# Patient Record
Sex: FEMALE | Race: WHITE | NOT HISPANIC OR LATINO
[De-identification: names, ages, dates, MRNs, and addresses within clinical notes are randomized per-mention and may not be internally consistent; named-entity substitution may affect disease eponyms.]

---

## 2022-12-11 ENCOUNTER — NON-APPOINTMENT (OUTPATIENT)
Age: 67
End: 2022-12-11

## 2023-07-24 ENCOUNTER — APPOINTMENT (OUTPATIENT)
Dept: ORTHOPEDIC SURGERY | Facility: CLINIC | Age: 68
End: 2023-07-24
Payer: MEDICARE

## 2023-07-24 VITALS — BODY MASS INDEX: 39.24 KG/M2 | HEIGHT: 67 IN | WEIGHT: 250 LBS

## 2023-07-24 VITALS — BODY MASS INDEX: 41.12 KG/M2 | WEIGHT: 262 LBS | HEIGHT: 67 IN

## 2023-07-24 DIAGNOSIS — Z78.9 OTHER SPECIFIED HEALTH STATUS: ICD-10-CM

## 2023-07-24 DIAGNOSIS — I10 ESSENTIAL (PRIMARY) HYPERTENSION: ICD-10-CM

## 2023-07-24 DIAGNOSIS — M19.90 UNSPECIFIED OSTEOARTHRITIS, UNSPECIFIED SITE: ICD-10-CM

## 2023-07-24 DIAGNOSIS — E11.9 TYPE 2 DIABETES MELLITUS W/OUT COMPLICATIONS: ICD-10-CM

## 2023-07-24 PROBLEM — Z00.00 ENCOUNTER FOR PREVENTIVE HEALTH EXAMINATION: Status: ACTIVE | Noted: 2023-07-24

## 2023-07-24 PROCEDURE — 20610 DRAIN/INJ JOINT/BURSA W/O US: CPT | Mod: RT

## 2023-07-24 PROCEDURE — 99204 OFFICE O/P NEW MOD 45 MIN: CPT | Mod: 25

## 2023-07-24 RX ORDER — TRAMADOL HYDROCHLORIDE 25 MG/1
TABLET, COATED ORAL
Refills: 0 | Status: ACTIVE | COMMUNITY

## 2023-07-24 RX ORDER — METFORMIN HYDROCHLORIDE 625 MG/1
TABLET ORAL
Refills: 0 | Status: ACTIVE | COMMUNITY

## 2023-07-24 RX ORDER — METOPROLOL TARTRATE 75 MG/1
TABLET, FILM COATED ORAL
Refills: 0 | Status: ACTIVE | COMMUNITY

## 2023-07-24 NOTE — DISCUSSION/SUMMARY
[de-identified] : Her Right knee is more painful then the Left\par Discussed options. If she does require TKA at some point she will need to get BMI under 40\par \par Large joint injection was performed of the RIGHT knee.\par The indication for this procedure was pain, inflammation and x-ray evidence of Osteoarthritis on this or prior visit. The site was prepped with betadine and sterile technique used.An injection of Lidocaine 5cc of 1%  was used Betamethasone (Celestone) 1cc of 6mg. \par Patient tolerated procedure well. Patient was advised to call if redness, pain or fever occur and apply ice for 15 minutes out of every hour for the next 12-24 hours as tolerated. \par \par Patient has tried OTC's including aspirin, Ibuprofen, Aleve, etc or prescription NSAIDS, and/or exercises at home and/or physical therapy without satisfactory response, patient had decreased mobility in the joint and the risks benefits, and alternatives have been discussed, and verbal consent was obtained. v\par

## 2023-07-24 NOTE — HISTORY OF PRESENT ILLNESS
[de-identified] : patient is here today for DENIA Knees. pt states she fell off of chair and hurt her knees and went to rehab. Pain management sent her to us. Patient denies prior knee surgery or CSI or lubricant injections. her Right knee is painful laterally and the Left knee pain is medial aspect of the knee.  She is ambulating with walker for longer distances cane for shorter distances. She went to [facility] and had xrays.

## 2023-07-24 NOTE — PHYSICAL EXAM
[Bilateral] : knee bilaterally [de-identified] : Constitutional: The patient appears well developed, well nourished. Examination of patients ability to communicate functionally was normal. \par \par Neurologic: Coordination is normal. Alert and oriented to time, place and person. No evidence of mood disorder, calm affect. \par \par   RIGHT   KNEE  : Inspection of the knee is as follows: moderate effusion. no ecchymosis, no streaking, no erythema, no atrophy, no deformities of the quad tendon and no deformities of patellar tendon. \par VARUS ALIGNMENT B/L KNEES\par Palpation of the knee is as follows: medial joint line tenderness, lateral joint line tenderness, medial facet of patella tenderness, lateral facet of patella tenderness and crepitus. no palpable masses and no increased warmth. \par \par Knee Range of Motion is as follows in degrees:  \par \par Extension: 5 \par Flexion: 105  \par \par Strength examination of the knee is as follows: \par Quadriceps strength is 5/5 \par Hamstring strength is 5/5 \par \par Ligament Stability and Special Test ligamentously stable, negative anterior draw, negative Lachman test, negative posterior draw and no varus or valgus instability. \par negative McMurrays test and able to do active straight leg raise without an extensor lag. \par \par Neurological examination of the knee is as follows: light touch is intact throughout. \par \par Gait and function is as follows: mildly antalgic gait. \par  \par \par Constitutional: The patient appears well developed, well nourished. Examination of patients ability to communicate functionally was normal. \par \par Neurologic: Coordination is normal. Alert and oriented to time, place and person. No evidence of mood disorder, calm affect. \par \par    LEFT   KNEE  : Inspection of the knee is as follows: moderate effusion. no ecchymosis, no streaking, no erythema, no atrophy, no deformities of the quad tendon and no deformities of patellar tendon. \par \par Palpation of the knee is as follows: medial joint line tenderness, lateral joint line tenderness, medial facet of patella tenderness, lateral facet of patella tenderness and crepitus. no palpable masses and no increased warmth. \par \par Knee Range of Motion is as follows in degrees:  \par \par Extension: 2 \par Flexion: 105  \par \par Strength examination of the knee is as follows: \par Quadriceps strength is 5/5 \par Hamstring strength is 5/5 \par \par Ligament Stability and Special Test ligamentously stable, negative anterior draw, negative Lachman test, negative posterior draw and no varus or valgus instability. \par negative McMurrays test and able to do active straight leg raise without an extensor lag. \par \par Neurological examination of the knee is as follows: light touch is intact throughout. \par \par Gait and function is as follows: mildly antalgic gait. \par  WALKER [[email or web address]] : ap/lat/sunrise taken at [facility] show severe DJD medial joint bone on bone contact and PF bone on bone contact.

## 2023-08-21 ENCOUNTER — APPOINTMENT (OUTPATIENT)
Dept: ORTHOPEDIC SURGERY | Facility: CLINIC | Age: 68
End: 2023-08-21
Payer: MEDICARE

## 2023-08-21 VITALS — BODY MASS INDEX: 41.12 KG/M2 | HEIGHT: 67 IN | WEIGHT: 262 LBS

## 2023-08-21 PROCEDURE — 99213 OFFICE O/P EST LOW 20 MIN: CPT

## 2023-10-16 ENCOUNTER — APPOINTMENT (OUTPATIENT)
Dept: ORTHOPEDIC SURGERY | Facility: CLINIC | Age: 68
End: 2023-10-16
Payer: MEDICARE

## 2023-10-16 VITALS — HEIGHT: 67 IN | BODY MASS INDEX: 40.81 KG/M2 | WEIGHT: 260 LBS

## 2023-10-16 DIAGNOSIS — M17.11 UNILATERAL PRIMARY OSTEOARTHRITIS, RIGHT KNEE: ICD-10-CM

## 2023-10-16 DIAGNOSIS — M17.12 UNILATERAL PRIMARY OSTEOARTHRITIS, LEFT KNEE: ICD-10-CM

## 2023-10-16 PROCEDURE — 20610 DRAIN/INJ JOINT/BURSA W/O US: CPT | Mod: RT

## 2023-10-16 PROCEDURE — 99213 OFFICE O/P EST LOW 20 MIN: CPT | Mod: 25

## 2023-10-23 ENCOUNTER — APPOINTMENT (OUTPATIENT)
Dept: ORTHOPEDIC SURGERY | Facility: CLINIC | Age: 68
End: 2023-10-23
Payer: MEDICARE

## 2023-10-23 PROCEDURE — 20610 DRAIN/INJ JOINT/BURSA W/O US: CPT | Mod: 50

## 2023-10-30 ENCOUNTER — APPOINTMENT (OUTPATIENT)
Dept: ORTHOPEDIC SURGERY | Facility: CLINIC | Age: 68
End: 2023-10-30
Payer: MEDICARE

## 2023-10-30 PROCEDURE — 20610 DRAIN/INJ JOINT/BURSA W/O US: CPT | Mod: 50
